# Patient Record
Sex: FEMALE | Race: BLACK OR AFRICAN AMERICAN | NOT HISPANIC OR LATINO | URBAN - METROPOLITAN AREA
[De-identification: names, ages, dates, MRNs, and addresses within clinical notes are randomized per-mention and may not be internally consistent; named-entity substitution may affect disease eponyms.]

---

## 2018-04-19 ENCOUNTER — OFFICE (OUTPATIENT)
Dept: URBAN - METROPOLITAN AREA CLINIC 78 | Facility: CLINIC | Age: 53
End: 2018-04-19
Payer: COMMERCIAL

## 2018-04-19 VITALS
TEMPERATURE: 98.4 F | HEART RATE: 94 BPM | HEIGHT: 66 IN | SYSTOLIC BLOOD PRESSURE: 85 MMHG | WEIGHT: 251 LBS | DIASTOLIC BLOOD PRESSURE: 63 MMHG

## 2018-04-19 DIAGNOSIS — D50.9 IRON DEFICIENCY ANEMIA, UNSPECIFIED: ICD-10-CM

## 2018-04-19 DIAGNOSIS — K59.09 OTHER CONSTIPATION: ICD-10-CM

## 2018-04-19 DIAGNOSIS — R53.83 OTHER FATIGUE: ICD-10-CM

## 2018-04-19 DIAGNOSIS — K62.5 HEMORRHAGE OF ANUS AND RECTUM: ICD-10-CM

## 2018-04-19 DIAGNOSIS — Z98.84 BARIATRIC SURGERY STATUS: ICD-10-CM

## 2018-04-19 DIAGNOSIS — Z79.01 LONG TERM (CURRENT) USE OF ANTICOAGULANTS: ICD-10-CM

## 2018-04-19 PROCEDURE — 99215 OFFICE O/P EST HI 40 MIN: CPT

## 2018-04-19 NOTE — SERVICEHPINOTES
54 yo female presents with constipation and rectal bleeding. She is s/p gastric bypass in 1999. Notes alternating constipation and diarrhea. Used to have 2 BMs per day. Currently reports 10+ BMs per day but mostly having urge from BM and only able to pass small amount of stool. She states she has to urinate and then will feel like she needs to have a BM so will strain to pass stool. Will pass small amount of stool and can have mucous in stools. Can have rectal pain and bleeding and notes she has h/o anal fissure. She was started on iron pills but felt like constipation was worse. Started on Colace, helping somewhat. She denies abdominal pain. No prior colonoscopy. Was here in 2016 but failed to f/u on this. Also here in 2009 and failed to f/u. Has had long-standing anemia. Reports both iron and B12 deficiency. She has been having a lot of fatigue - has insomnia issues. She feels itchy and notes hair loss. She is a smoker. Has high stress levels.   She reports an MI in 2013 but a f/u cardiac cath was reportedly negative and no stent was placed. Denies any further heart issues. No chest pain.She is on Xarelto for remote h/o DVT. Was off this for awhile but recently started back on it due to her history. 3/30/18 Hgb 10.7, MCV 88BR1/12/18 HgbA1C 5.8FONT style="BACKGROUND-COLOR: #ffffcc" visited="true"BR/FONT

## 2018-04-19 NOTE — INTERFACERESULTNOTES
Patient has kidney disease - recommend referral to nephrologist (Dr. Prashant Rodriguez or Dr. Gemma Diaz) if she doesn't already have one. Needs to stop taking iron - has high levels. Vitamin B12 level is ok but folic acid is low - start 800 mcg pill daily. Thyroid studies are ok. Liver enzymes also abnormal. Get abdominal U/S and plan for f/u OV.

## 2018-04-21 LAB
AMBIG ABBREV CMP14 DEFAULT: (no result)
CBC WITH DIFFERENTIAL/PLATELET: BASO (ABSOLUTE): 0 X10E3/UL (ref 0–0.2)
CBC WITH DIFFERENTIAL/PLATELET: BASOS: 1 %
CBC WITH DIFFERENTIAL/PLATELET: EOS (ABSOLUTE): 0.1 X10E3/UL (ref 0–0.4)
CBC WITH DIFFERENTIAL/PLATELET: EOS: 2 %
CBC WITH DIFFERENTIAL/PLATELET: HEMATOCRIT: 32 % — LOW (ref 34–46.6)
CBC WITH DIFFERENTIAL/PLATELET: HEMATOLOGY COMMENTS: (no result)
CBC WITH DIFFERENTIAL/PLATELET: HEMOGLOBIN: 10.1 G/DL — LOW (ref 11.1–15.9)
CBC WITH DIFFERENTIAL/PLATELET: IMMATURE CELLS: (no result)
CBC WITH DIFFERENTIAL/PLATELET: IMMATURE GRANS (ABS): 0 X10E3/UL (ref 0–0.1)
CBC WITH DIFFERENTIAL/PLATELET: IMMATURE GRANULOCYTES: 0 %
CBC WITH DIFFERENTIAL/PLATELET: LYMPHS (ABSOLUTE): 2.3 X10E3/UL (ref 0.7–3.1)
CBC WITH DIFFERENTIAL/PLATELET: LYMPHS: 52 %
CBC WITH DIFFERENTIAL/PLATELET: MCH: 28.9 PG (ref 26.6–33)
CBC WITH DIFFERENTIAL/PLATELET: MCHC: 31.6 G/DL (ref 31.5–35.7)
CBC WITH DIFFERENTIAL/PLATELET: MCV: 92 FL (ref 79–97)
CBC WITH DIFFERENTIAL/PLATELET: MONOCYTES(ABSOLUTE): 0.4 X10E3/UL (ref 0.1–0.9)
CBC WITH DIFFERENTIAL/PLATELET: MONOCYTES: 10 %
CBC WITH DIFFERENTIAL/PLATELET: NEUTROPHILS (ABSOLUTE): 1.5 X10E3/UL (ref 1.4–7)
CBC WITH DIFFERENTIAL/PLATELET: NEUTROPHILS: 35 %
CBC WITH DIFFERENTIAL/PLATELET: NRBC: (no result)
CBC WITH DIFFERENTIAL/PLATELET: PLATELETS: 227 X10E3/UL (ref 150–379)
CBC WITH DIFFERENTIAL/PLATELET: RBC: 3.49 X10E6/UL — LOW (ref 3.77–5.28)
CBC WITH DIFFERENTIAL/PLATELET: RDW: 18.9 % — HIGH (ref 12.3–15.4)
CBC WITH DIFFERENTIAL/PLATELET: WBC: 4.4 X10E3/UL (ref 3.4–10.8)
COMP. METABOLIC PANEL (14): A/G RATIO: 1.6 (ref 1.2–2.2)
COMP. METABOLIC PANEL (14): ALBUMIN: 3.3 G/DL — LOW (ref 3.5–5.5)
COMP. METABOLIC PANEL (14): ALKALINE PHOSPHATASE: 122 IU/L — HIGH (ref 39–117)
COMP. METABOLIC PANEL (14): ALT (SGPT): 35 IU/L — HIGH (ref 0–32)
COMP. METABOLIC PANEL (14): AST (SGOT): 39 IU/L (ref 0–40)
COMP. METABOLIC PANEL (14): BILIRUBIN, TOTAL: <0.2 MG/DL
COMP. METABOLIC PANEL (14): BUN/CREATININE RATIO: 18 (ref 9–23)
COMP. METABOLIC PANEL (14): BUN: 23 MG/DL (ref 6–24)
COMP. METABOLIC PANEL (14): CALCIUM: 8.9 MG/DL (ref 8.7–10.2)
COMP. METABOLIC PANEL (14): CARBON DIOXIDE, TOTAL: 17 MMOL/L — LOW (ref 18–29)
COMP. METABOLIC PANEL (14): CHLORIDE: 107 MMOL/L — HIGH (ref 96–106)
COMP. METABOLIC PANEL (14): CREATININE: 1.31 MG/DL — HIGH (ref 0.57–1)
COMP. METABOLIC PANEL (14): EGFR IF AFRICN AM: 53 ML/MIN/1.73 — LOW (ref 59–?)
COMP. METABOLIC PANEL (14): EGFR IF NONAFRICN AM: 46 ML/MIN/1.73 — LOW (ref 59–?)
COMP. METABOLIC PANEL (14): GLOBULIN, TOTAL: 2.1 G/DL (ref 1.5–4.5)
COMP. METABOLIC PANEL (14): GLUCOSE: 87 MG/DL (ref 65–99)
COMP. METABOLIC PANEL (14): POTASSIUM: 5.4 MMOL/L — HIGH (ref 3.5–5.2)
COMP. METABOLIC PANEL (14): PROTEIN, TOTAL: 5.4 G/DL — LOW (ref 6–8.5)
COMP. METABOLIC PANEL (14): SODIUM: 139 MMOL/L (ref 134–144)
FERRITIN, SERUM: 507 NG/ML — HIGH (ref 15–150)
IRON AND TIBC: IRON BIND.CAP.(TIBC): 219 UG/DL — LOW (ref 250–450)
IRON AND TIBC: IRON SATURATION: 47 % (ref 15–55)
IRON AND TIBC: IRON: 104 UG/DL (ref 27–159)
IRON AND TIBC: UIBC: 115 UG/DL — LOW (ref 131–425)
THYROXINE (T4) FREE, DIRECT, S: T4,FREE(DIRECT): 1.2 NG/DL (ref 0.82–1.77)
TRIIODOTHYRONINE,FREE,SERUM: 2.3 PG/ML (ref 2–4.4)
TSH: 0.75 UIU/ML (ref 0.45–4.5)
VITAMIN B12 AND FOLATE: FOLATE (FOLIC ACID), SERUM: 2.3 NG/ML — LOW (ref 3–?)
VITAMIN B12 AND FOLATE: VITAMIN B12: 551 PG/ML (ref 232–1245)

## 2018-04-25 ENCOUNTER — ON CAMPUS - OUTPATIENT (OUTPATIENT)
Dept: URBAN - METROPOLITAN AREA HOSPITAL 14 | Facility: HOSPITAL | Age: 53
End: 2018-04-25

## 2018-04-25 ENCOUNTER — OFFICE (OUTPATIENT)
Dept: URBAN - METROPOLITAN AREA CLINIC 30 | Facility: CLINIC | Age: 53
End: 2018-04-25

## 2018-04-25 DIAGNOSIS — K29.60 OTHER GASTRITIS WITHOUT BLEEDING: ICD-10-CM

## 2018-04-25 DIAGNOSIS — R19.7 DIARRHEA, UNSPECIFIED: ICD-10-CM

## 2018-04-25 DIAGNOSIS — D50.9 IRON DEFICIENCY ANEMIA, UNSPECIFIED: ICD-10-CM

## 2018-04-25 PROCEDURE — 45380 COLONOSCOPY AND BIOPSY: CPT

## 2018-04-25 PROCEDURE — 00014: CPT | Performed by: INTERNAL MEDICINE

## 2018-04-25 PROCEDURE — 43239 EGD BIOPSY SINGLE/MULTIPLE: CPT
